# Patient Record
Sex: MALE | Race: WHITE | Employment: UNEMPLOYED | URBAN - METROPOLITAN AREA
[De-identification: names, ages, dates, MRNs, and addresses within clinical notes are randomized per-mention and may not be internally consistent; named-entity substitution may affect disease eponyms.]

---

## 2021-03-10 ENCOUNTER — HOSPITAL ENCOUNTER (EMERGENCY)
Age: 31
Discharge: HOME | End: 2021-03-10
Payer: MEDICAID

## 2021-03-10 VITALS
RESPIRATION RATE: 16 BRPM | HEART RATE: 90 BPM | TEMPERATURE: 97.5 F | OXYGEN SATURATION: 96 % | SYSTOLIC BLOOD PRESSURE: 130 MMHG | WEIGHT: 190 LBS | BODY MASS INDEX: 25.73 KG/M2 | DIASTOLIC BLOOD PRESSURE: 66 MMHG | HEIGHT: 72 IN

## 2021-03-10 DIAGNOSIS — K14.0 TONGUE ULCERATION: ICD-10-CM

## 2021-03-10 DIAGNOSIS — J02.0 STREPTOCOCCAL SORE THROAT: Primary | ICD-10-CM

## 2021-03-10 LAB — STREP GRP A PCR: POSITIVE

## 2021-03-10 PROCEDURE — 6370000000 HC RX 637 (ALT 250 FOR IP): Performed by: PHYSICIAN ASSISTANT

## 2021-03-10 PROCEDURE — 99283 EMERGENCY DEPT VISIT LOW MDM: CPT

## 2021-03-10 PROCEDURE — 96372 THER/PROPH/DIAG INJ SC/IM: CPT

## 2021-03-10 PROCEDURE — 87880 STREP A ASSAY W/OPTIC: CPT

## 2021-03-10 PROCEDURE — 6360000002 HC RX W HCPCS: Performed by: PHYSICIAN ASSISTANT

## 2021-03-10 RX ORDER — METHYLPREDNISOLONE 4 MG/1
TABLET ORAL
Qty: 21 TABLET | Refills: 0 | Status: SHIPPED | OUTPATIENT
Start: 2021-03-10 | End: 2021-03-16

## 2021-03-10 RX ORDER — OXYCODONE HYDROCHLORIDE AND ACETAMINOPHEN 5; 325 MG/1; MG/1
2 TABLET ORAL ONCE
Status: COMPLETED | OUTPATIENT
Start: 2021-03-10 | End: 2021-03-10

## 2021-03-10 RX ADMIN — OXYCODONE HYDROCHLORIDE AND ACETAMINOPHEN 2 TABLET: 5; 325 TABLET ORAL at 11:13

## 2021-03-10 RX ADMIN — PENICILLIN G BENZATHINE 1.2 MILLION UNITS: 1200000 INJECTION, SUSPENSION INTRAMUSCULAR at 12:01

## 2021-03-10 ASSESSMENT — PAIN DESCRIPTION - LOCATION: LOCATION: OTHER (COMMENT)

## 2021-03-10 ASSESSMENT — PAIN SCALES - GENERAL: PAINLEVEL_OUTOF10: 10

## 2021-03-10 NOTE — ED PROVIDER NOTES
has no past surgical history on file. Social History:  reports that he has been smoking. He has never used smokeless tobacco. He reports current alcohol use. He reports current drug use. Drugs: IV, Cocaine, Marijuana, Methamphetamines, and Opiates . Family History: family history is not on file. Allergies: Patient has no known allergies. Physical Exam   Oxygen Saturation Interpretation: Normal.        ED Triage Vitals   BP Temp Temp src Pulse Resp SpO2 Height Weight   -- -- -- -- -- -- -- --         Constitutional:  Alert, development consistent with age. Comfortable appearing. Ears:  TMs without perforation, injection, or bulging. External canals clear without exudate. Throat: Airway Patent. marked erythema, halitosis, tonsillar hypertrophy, 2+, exudates present, throat culture taken, mucous membranes moist and there is a large tender firm ulceration on the lateral aspect of the tongue on the left no other lesions noted. .       Neck/Lymphatic:  Supple. There is Bilateral  anterior cervical and posterior cervical node tenderness. respiratory:  Clear to auscultation and breath sounds equal.    CV: Regular rate and rhythm, normal heart sounds, without pathological murmurs, ectopy, gallops, or rubs. GI:  Abdomen Soft, nontender, good bowel sounds. No firm or pulsatile mass. Inegument:  No rashes, erythema present. Neurological:  Oriented. Motor functions intact. Lab / Imaging Results   (All laboratory and radiology results have been personally reviewed by myself)  Labs:  Results for orders placed or performed during the hospital encounter of 03/10/21   Strep Screen Group A Throat    Specimen: Throat   Result Value Ref Range    Strep Grp A PCR POSITIVE Negative     Imaging: All Radiology results interpreted by Radiologist unless otherwise noted.   No orders to display       ED Course / Medical Decision Making     Medications   oxyCODONE-acetaminophen (PERCOCET) 5-325 MG per tablet 2 tablet (2 tablets Oral Given 3/10/21 1113)   penicillin G benzathine (BICILLIN L-A) [id number] UNIT/2ML suspension 1.2 Million Units (1.2 Million Units Intramuscular Given 3/10/21 1201)        Consult(s):   None    Procedure(s):   none    MDM:   Based on high suspicion for Strep as per history/physical findings, Rapid Strep/Throat Culture testing was done and confirms the above findings  Pharyngitis is likely to  be Strep. Antibiotics are indicated at this time based on clinical presentation and physical findings. Not hypoxic, nothing to suggest pneumonia. Patient is well appearing, non toxic and appropriate for outpatient management. Plan of Care: Normal progression of disease discussed. All questions answered. Instruction provided in the use of fluids, vaporizer, acetaminophen, and other OTC medication for symptom control. Extra fluids  Analgesics as needed, dose reviewed. Follow up as needed should symptoms fail to improve. Counseling:  I reviewed today's visit with the patient in addition to providing specific details for the plan of care and counseling regarding the diagnosis and prognosis. Questions are answered at this time and are agreeable with the plan. Assessment     1. Streptococcal sore throat    2. Tongue ulceration      Plan   Discharge home. Patient condition is good    New Medications     Discharge Medication List as of 3/10/2021 12:05 PM      START taking these medications    Details   methylPREDNISolone (MEDROL, PALOMO,) 4 MG tablet Take as directed on package insert days 1-6, Disp-21 tablet, R-0Print           Electronically signed by BRANDON Daley   DD: 3/10/21  **This report was transcribed using voice recognition software. Every effort was made to ensure accuracy; however, inadvertent computerized transcription errors may be present.   END OF ED PROVIDER NOTE       Pauline Moore  03/10/21 6343

## 2021-03-20 ENCOUNTER — HOSPITAL ENCOUNTER (EMERGENCY)
Age: 31
Discharge: HOME | End: 2021-03-20
Attending: EMERGENCY MEDICINE
Payer: MEDICAID

## 2021-03-20 VITALS
TEMPERATURE: 98.4 F | DIASTOLIC BLOOD PRESSURE: 77 MMHG | SYSTOLIC BLOOD PRESSURE: 113 MMHG | OXYGEN SATURATION: 97 % | WEIGHT: 200 LBS | BODY MASS INDEX: 27.09 KG/M2 | HEART RATE: 82 BPM | HEIGHT: 72 IN | RESPIRATION RATE: 18 BRPM

## 2021-03-20 DIAGNOSIS — T65.893A POISONING BY PEPPER SPRAY, ASSAULT, INITIAL ENCOUNTER: Primary | ICD-10-CM

## 2021-03-20 PROCEDURE — 6370000000 HC RX 637 (ALT 250 FOR IP): Performed by: EMERGENCY MEDICINE

## 2021-03-20 PROCEDURE — 96372 THER/PROPH/DIAG INJ SC/IM: CPT

## 2021-03-20 PROCEDURE — 99284 EMERGENCY DEPT VISIT MOD MDM: CPT

## 2021-03-20 PROCEDURE — 6360000002 HC RX W HCPCS: Performed by: EMERGENCY MEDICINE

## 2021-03-20 RX ORDER — TETRACAINE HYDROCHLORIDE 5 MG/ML
2 SOLUTION OPHTHALMIC ONCE
Status: COMPLETED | OUTPATIENT
Start: 2021-03-20 | End: 2021-03-20

## 2021-03-20 RX ORDER — KETOROLAC TROMETHAMINE 30 MG/ML
30 INJECTION, SOLUTION INTRAMUSCULAR; INTRAVENOUS ONCE
Status: COMPLETED | OUTPATIENT
Start: 2021-03-20 | End: 2021-03-20

## 2021-03-20 RX ADMIN — TETRACAINE HYDROCHLORIDE 2 DROP: 5 SOLUTION OPHTHALMIC at 05:56

## 2021-03-20 RX ADMIN — KETOROLAC TROMETHAMINE 30 MG: 30 INJECTION, SOLUTION INTRAMUSCULAR at 06:29

## 2021-03-20 ASSESSMENT — PAIN DESCRIPTION - PAIN TYPE: TYPE: ACUTE PAIN

## 2021-03-20 ASSESSMENT — PAIN DESCRIPTION - DESCRIPTORS: DESCRIPTORS: BURNING
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
## 2021-03-20 NOTE — ED NOTES
At this time, patient reports no pain in eyes or face but his scrotum is experiencing pain as he utilized the bathroom without washing hands. At this time, patient not wanting to go to eyewash station as he is in no pain in the ocular region. Pt currently placing ice pack on scrotum for relief. Attending aware. Jonny Fitzgerald.  Maude Ansari, [address] [place] [address]  03/20/21 3155

## 2021-03-20 NOTE — ED NOTES
Attempted to take pt to eye wash station, unwilling at this time.      Luzmaria Juarez RN  03/20/21 3728

## 2021-03-20 NOTE — ED NOTES
Pt taken to eyewash station. Utilized station. Pt feeling better at this time. Marleen Andrade.  Bhanu Vigil, [facility]  03/20/21 9713